# Patient Record
Sex: MALE | Race: WHITE
[De-identification: names, ages, dates, MRNs, and addresses within clinical notes are randomized per-mention and may not be internally consistent; named-entity substitution may affect disease eponyms.]

---

## 2018-02-28 ENCOUNTER — HOSPITAL ENCOUNTER (EMERGENCY)
Dept: HOSPITAL 45 - C.EDB | Age: 28
Discharge: HOME | End: 2018-02-28
Payer: SELF-PAY

## 2018-02-28 VITALS
HEIGHT: 72.01 IN | BODY MASS INDEX: 39.36 KG/M2 | WEIGHT: 290.57 LBS | BODY MASS INDEX: 39.36 KG/M2 | WEIGHT: 290.57 LBS | HEIGHT: 72.01 IN

## 2018-02-28 VITALS — OXYGEN SATURATION: 96 % | DIASTOLIC BLOOD PRESSURE: 84 MMHG | SYSTOLIC BLOOD PRESSURE: 171 MMHG | HEART RATE: 84 BPM

## 2018-02-28 VITALS — TEMPERATURE: 98.24 F

## 2018-02-28 DIAGNOSIS — K08.89: Primary | ICD-10-CM

## 2018-02-28 RX ADMIN — PENICILLIN V POTASIUM ONE HOMEPACK: 250 TABLET ORAL at 23:42

## 2018-02-28 RX ADMIN — HYDROCODONE BITARTRATE AND ACETAMINOPHEN ONE HOMEPACK: 5; 325 TABLET ORAL at 23:41

## 2018-03-01 NOTE — EMERGENCY ROOM VISIT NOTE
ED Visit Note


First contact with patient:  23:09


CHIEF COMPLAINT:  Toothache 








HISTORY OF PRESENT ILLNESS:  This 27 year old male patient presented to the 

emergency department with a progressive toothache for past 2-3 days.  The 

patient believes it is coming from a left lower molar.  The pain is now steady 

and severe and radiates to the face.  The patient does not have a dentist 

appointment set up.  They rate their pain a 9/10 and the ibuprofen and Tylenol 

they have been taking has not relieved the pain.  Denies facial swelling or 

fever.  The patient denies any discharge from the mouth.





 


REVIEW OF SYSTEMS:   A 6 system review of systems was completed with positives 

and pertinent negatives listed in the HPI.  








ALLERGIES: No known allergies








MEDICATIONS: No chronic medication








PMH: Otherwise healthy








SOCIAL HISTORY: Recently moved to the area. Previously lived in [place].








PHYSICAL EXAM: Vitals are noted on the nurse's note and reviewed by myself.  

Vital signs stable.  


GENERAL: White male, in no acute distress, nondiaphoretic, well-developed well-

nourished.   


Mouth:  The left lower first, second, and third molars tooth are very carious 

and the gum is swollen and tender around it, without any discharge or signs of 

an abscess.  The remainder of the pharynx and tonsils are without erythema, 

edema, or exudate.  The airway is patent.  There is no facial swelling, 

cervical or submandibular lymphadenopathy.  The patient appears uncomfortable 

and in pain.  The patient has overall poor dental hygiene.  


EARS: External auditory canals clear, tympanic membranes pearly gray without 

erythema or effusion bilaterally.


HEART: Regular rate and rhythm without murmur gallop or rub


LUNG: Clear to auscultation bilateral








ED COURSE:  Physical exam and history were performed.  Nursing notes and EMR 

were reviewed.  The patient has poor dentition.  He is having pain of the left 

lower jaw.  I do not appreciate a distinct abscess or evidence of Cade some.  

There is no facial swelling.  The patient has never been seen at this facility 

with his complaint.  There are no concerning findings in the PDMP.  The patient 

will be given a course of Pen-Vee K and a home pack of Vicodin.  He was 

thoroughly educated to follow with her dentist for definitive care.  He was 

otherwise invited back to the ER with any new, worsening, or concerning 

symptoms.


Current/Historical Medications


Scheduled


Penicillin V Potassium (Veetids), 500 MG PO QID





Allergies


Coded Allergies:  


     No Known Allergies (Unverified , 2/28/18)





Vital Signs











  Date Time  Temp Pulse Resp B/P (MAP) Pulse Ox O2 Delivery O2 Flow Rate FiO2


 


2/28/18 23:46  84 18 171/84 96   


 


2/28/18 22:46 36.8 84 18 171/84 96 Room Air  











Medications Administered











 Medications


  (Trade)  Dose


 Ordered  Sig/Leonard


 Route  Start Time


 Stop Time Status Last Admin


Dose Admin


 


 Penicillin V


 Potassium


  (Pen-Vk 500MG


 Home Pack)  1 homepack  UD  ONCE


 PO  2/28/18 23:30


 2/28/18 23:31 DC 2/28/18 23:42


1 HOMEPACK


 


 Acetaminophen/


 Hydrocodone Bitart


  (Norco 5/325mg


 Home Pack)  1 homepack  UD  ONCE


 PO  2/28/18 23:30


 2/28/18 23:31 DC 2/28/18 23:41


1 HOMEPACK











Departure Information


Impression





 Primary Impression:  


 Pain, dental





Dispostion


Home / Self-Care





Condition


GOOD





Prescriptions





Penicillin V Potassium (Veetids) 500 Mg Tab


500 MG PO QID, #40 TAB


   Prov: Cole Lynn PA-C         2/28/18





Forms


HOME CARE DOCUMENTATION FORM,                                                 

               IMPORTANT VISIT INFORMATION





Patient Instructions


My [facility]





Additional Instructions





You were seen and evaluated today on an emergency basis only.  This is not a 

substitute for, or an effort to provide, complete comprehensive medical care.  

It is not possible to recognize and treat all injuries or illnesses in a single 

emergency department visit. For this reason it is recommended that you followup 

with a dentist as soon as possible for definitive care.





The emergency department is not able to treat ongoing dental issues.





Pen-Vee K 500 mg 4 times daily for the next 10 days.





For baseline pain relief you may alternate ibuprofen and acetaminophen every 4 

hours for pain control. Take 600 mg ibuprofen (Advil) and then 4 hours later 

take 1000 mg acetaminophen (Tylenol). Do not take more than 3000 mg 

acetaminophen in a single day.  





Norco (hydrocodone/acetaminophen) 5/325 mg (homepack) every 6 hours as needed 

for worsening breakthrough pain. Do not drink or drive on Norco. This 

medication will likely make you tired. Do not take Norco and Tylenol at the 

same time as both contain acetaminophen. Norco may cause constipation. You may 

wish to take an over-the-counter stool softener like Colace if this occurs.





You are welcome to return to the emergency department anytime with new, 

worsening, or concerning symptoms.